# Patient Record
Sex: MALE | Race: WHITE | NOT HISPANIC OR LATINO
[De-identification: names, ages, dates, MRNs, and addresses within clinical notes are randomized per-mention and may not be internally consistent; named-entity substitution may affect disease eponyms.]

---

## 2022-07-01 ENCOUNTER — APPOINTMENT (OUTPATIENT)
Dept: ORTHOPEDIC SURGERY | Facility: CLINIC | Age: 67
End: 2022-07-01

## 2022-07-01 VITALS — BODY MASS INDEX: 27.49 KG/M2 | HEIGHT: 65 IN | WEIGHT: 165 LBS

## 2022-07-01 PROCEDURE — 73562 X-RAY EXAM OF KNEE 3: CPT | Mod: LT

## 2022-07-01 PROCEDURE — 99204 OFFICE O/P NEW MOD 45 MIN: CPT | Mod: 25

## 2022-07-01 PROCEDURE — 20610 DRAIN/INJ JOINT/BURSA W/O US: CPT | Mod: LT

## 2022-07-01 PROCEDURE — J3490M: CUSTOM

## 2022-07-01 NOTE — PROCEDURE
[Large Joint Injection] : Large joint injection [Left] : of the left [Knee] : knee [Pain] : pain [Inflammation] : inflammation [X-ray evidence of Osteoarthritis on this or prior visit] : x-ray evidence of Osteoarthritis on this or prior visit [Alcohol] : alcohol [Betadine] : betadine [___ cc    1%] : Lidocaine ~Vcc of 1%  [___ cc    80mg] : Methylprednisolone (Depomedrol) ~Vcc of 80 mg  [Effusion] : effusion [] : Patient tolerated procedure well [Call if redness, pain or fever occur] : call if redness, pain or fever occur [Apply ice for 15min out of every hour for the next 12-24 hours as tolerated] : apply ice for 15 minutes out of every hour for the next 12-24 hours as tolerated [Patient had decreased mobility in the joint] : patient had decreased mobility in the joint [Risks, benefits, alternatives discussed / Verbal consent obtained] : the risks benefits, and alternatives have been discussed, and verbal consent was obtained [de-identified] : 35cc [de-identified] : synovial

## 2022-07-01 NOTE — PHYSICAL EXAM
[NL (0)] : extension 0 degrees [] : mildly antalgic [Left] : left knee [Mild tricompartmental OA medial narrowing] : Mild tricompartmental OA medial narrowing [Mild patellofemoral OA] : Mild patellofemoral OA [TWNoteComboBox7] : flexion 125 degrees

## 2022-07-01 NOTE — ASSESSMENT
[FreeTextEntry1] : aspirated 35cc, CSI tolerated well.\par Ice, elevate.\par Consider visco\par Follow up prn.

## 2022-07-01 NOTE — HISTORY OF PRESENT ILLNESS
[8] : 8 [de-identified] : 7/1/22: 66 yo male with left knee pain since 6/24/22. He states he noticed pain and swelling, no specific injury. There is pain going from seated to standing position. He takes Norco and NSAIDs. [FreeTextEntry5] : pt c.o pain and stiffness in lt knee for a week

## 2022-08-02 ENCOUNTER — APPOINTMENT (OUTPATIENT)
Dept: ORTHOPEDIC SURGERY | Facility: CLINIC | Age: 67
End: 2022-08-02

## 2022-09-16 ENCOUNTER — APPOINTMENT (OUTPATIENT)
Dept: ORTHOPEDIC SURGERY | Facility: CLINIC | Age: 67
End: 2022-09-16

## 2022-09-16 VITALS — BODY MASS INDEX: 27.49 KG/M2 | HEIGHT: 65 IN | WEIGHT: 165 LBS

## 2022-09-16 DIAGNOSIS — M17.12 UNILATERAL PRIMARY OSTEOARTHRITIS, LEFT KNEE: ICD-10-CM

## 2022-09-16 DIAGNOSIS — M25.462 EFFUSION, LEFT KNEE: ICD-10-CM

## 2022-09-16 PROCEDURE — 99214 OFFICE O/P EST MOD 30 MIN: CPT | Mod: 25

## 2022-09-16 PROCEDURE — 20615 TREATMENT OF BONE CYST: CPT

## 2022-09-16 RX ORDER — MELOXICAM 15 MG/1
15 TABLET ORAL
Qty: 30 | Refills: 0 | Status: ACTIVE | COMMUNITY
Start: 2022-09-16 | End: 1900-01-01

## 2022-09-16 NOTE — PHYSICAL EXAM
[Left] : left knee [NL (0)] : extension 0 degrees [5___] : quadriceps 5[unfilled]/5 [] : mildly antalgic [TWNoteComboBox7] : flexion 125 degrees

## 2022-09-16 NOTE — REASON FOR VISIT
[FreeTextEntry2] : This is a 67 year old M with recurrent left knee effusions.  Aspiration and injection with Ruth PARMAR in July with good relief.  Followed up with his joint surgeon and completed visco series without relief.  No fcs.

## 2022-09-16 NOTE — HISTORY OF PRESENT ILLNESS
[10] : 10 [Sharp] : sharp [FreeTextEntry5] : pt c.o pain and swelling in lt knee, pt states past tretment with carolyn jenkins at

## 2022-09-16 NOTE — PROCEDURE
[Large Joint Injection] : Large joint injection [Left] : of the left [Knee] : knee [Pain] : pain [Inflammation] : inflammation [Betadine] : betadine [Effusion] : effusion [] : Patient tolerated procedure well [Call if redness, pain or fever occur] : call if redness, pain or fever occur [Apply ice for 15min out of every hour for the next 12-24 hours as tolerated] : apply ice for 15 minutes out of every hour for the next 12-24 hours as tolerated [Patient was advised to rest the joint(s) for ____ days] : patient was advised to rest the joint(s) for [unfilled] days [Previous OTC use and PT nontherapeutic] : patient has tried OTC's including aspirin, Ibuprofen, Aleve, etc or prescription NSAIDS, and/or exercises at home and/or physical therapy without satisfactory response [Patient had decreased mobility in the joint] : patient had decreased mobility in the joint [Risks, benefits, alternatives discussed / Verbal consent obtained] : the risks benefits, and alternatives have been discussed, and verbal consent was obtained [de-identified] : 55 cc [de-identified] : yellow [de-identified] : clear

## 2022-09-16 NOTE — ASSESSMENT
[FreeTextEntry1] : XRs from July reviewed.\par Options outlined.\par Aspiration today; will defer CSI as he is planning for TKA at some point in the future.\par Also recent dx of prostate ca (very early stages).\par Mobic, prn.\par FU here as needed.\par He will see his surgeon.

## 2023-10-17 ENCOUNTER — APPOINTMENT (OUTPATIENT)
Dept: ORTHOPEDIC SURGERY | Facility: CLINIC | Age: 68
End: 2023-10-17
Payer: MEDICARE

## 2023-10-17 VITALS — BODY MASS INDEX: 26.99 KG/M2 | HEIGHT: 65 IN | WEIGHT: 162 LBS

## 2023-10-17 PROCEDURE — 73130 X-RAY EXAM OF HAND: CPT | Mod: 50

## 2023-10-17 PROCEDURE — 99203 OFFICE O/P NEW LOW 30 MIN: CPT | Mod: 25

## 2023-10-17 PROCEDURE — 20550 NJX 1 TENDON SHEATH/LIGAMENT: CPT | Mod: LT

## 2023-10-31 ENCOUNTER — APPOINTMENT (OUTPATIENT)
Dept: ORTHOPEDIC SURGERY | Facility: CLINIC | Age: 68
End: 2023-10-31
Payer: MEDICARE

## 2023-10-31 DIAGNOSIS — M65.331 TRIGGER FINGER, RIGHT MIDDLE FINGER: ICD-10-CM

## 2023-10-31 DIAGNOSIS — M65.342 TRIGGER FINGER, LEFT RING FINGER: ICD-10-CM

## 2023-10-31 DIAGNOSIS — M65.321 TRIGGER FINGER, RIGHT INDEX FINGER: ICD-10-CM

## 2023-10-31 PROCEDURE — 99212 OFFICE O/P EST SF 10 MIN: CPT

## 2024-08-12 ENCOUNTER — APPOINTMENT (OUTPATIENT)
Dept: ORTHOPEDIC SURGERY | Facility: CLINIC | Age: 69
End: 2024-08-12

## 2024-08-12 DIAGNOSIS — M47.812 SPONDYLOSIS W/OUT MYELOPATHY OR RADICULOPATHY, CERVICAL REGION: ICD-10-CM

## 2024-08-12 DIAGNOSIS — M54.12 RADICULOPATHY, CERVICAL REGION: ICD-10-CM

## 2024-08-12 DIAGNOSIS — Z78.9 OTHER SPECIFIED HEALTH STATUS: ICD-10-CM

## 2024-08-12 PROCEDURE — 99203 OFFICE O/P NEW LOW 30 MIN: CPT

## 2024-08-12 PROCEDURE — 72050 X-RAY EXAM NECK SPINE 4/5VWS: CPT

## 2024-08-12 PROCEDURE — 99213 OFFICE O/P EST LOW 20 MIN: CPT

## 2024-08-12 NOTE — HISTORY OF PRESENT ILLNESS
[Neck] : neck [Dull/Aching] : dull/aching [Tingling] : tingling [Constant] : constant [Nothing helps with pain getting better] : Nothing helps with pain getting better [] : no [FreeTextEntry5] : 70 yo M presenting with neck pain that started about 5 months ago but worsened over the past few weeks. Numbness/tingling in left hand at night.  [FreeTextEntry7] : left hand

## 2024-09-23 ENCOUNTER — APPOINTMENT (OUTPATIENT)
Dept: ORTHOPEDIC SURGERY | Facility: CLINIC | Age: 69
End: 2024-09-23

## 2024-09-23 DIAGNOSIS — M54.12 RADICULOPATHY, CERVICAL REGION: ICD-10-CM

## 2024-09-23 DIAGNOSIS — M47.812 SPONDYLOSIS W/OUT MYELOPATHY OR RADICULOPATHY, CERVICAL REGION: ICD-10-CM

## 2024-09-23 PROCEDURE — 99213 OFFICE O/P EST LOW 20 MIN: CPT

## 2024-09-23 NOTE — IMAGING
[de-identified] : L trap spasm and tenderness  pain with flexion pain with extension full ROM with mild stiffness  motor exam 5/5 strength bilaterally sensory intact negative hoffmans +L negative spurlings C8 dermatome of pain [FreeTextEntry1] : spondylosis, most notably at C6-7

## 2024-09-23 NOTE — ASSESSMENT
[FreeTextEntry1] : 70 yo M with persistent neck and LUE radicular pain despite 6 weeks of PT. XRs with spondylosis, most notably at C6-7. Has C8 dermatome of pain.   -New PT rx provided, going to transfer care to new facility -Acupuncture rx -MRI CS indicated for further eval -Discussed the role of KARLIE with pain mgmt pending results if he doesn't find relief with PT/acupuncture - f/u after MRI to review

## 2024-09-23 NOTE — HISTORY OF PRESENT ILLNESS
[Neck] : neck [Dull/Aching] : dull/aching [Tingling] : tingling [Constant] : constant [Nothing helps with pain getting better] : Nothing helps with pain getting better [de-identified] : 09/23/2024: f/u CS. continues with neck and LUE Pain with n/t down the arm into the 4th and 5th digits. Has done weeks of PT without much relief. Interested in acupuncture.  [] : no [FreeTextEntry5] : 70 yo M presenting here today for f/u on neck, pt states still has pain did 3 weeks physical therapy but didn't help too much and is curious about acupuncture.  [FreeTextEntry7] : left hand

## 2024-09-23 NOTE — PHYSICAL EXAM
[Normal Mood and Affect] : normal mood and affect [Oriented] : oriented [Able to Communicate] : able to communicate [No obvious lymphadenopathy in areas examined] : no obvious lymphadenopathy in areas examined [Well Developed] : well developed [Peripheral vascular exam is grossly normal] : peripheral vascular exam is grossly normal [Rotation to left] : rotation to left [Rotation to right] : rotation to right [] : Pain does not radiate to right arm with motion

## 2024-09-25 ENCOUNTER — APPOINTMENT (OUTPATIENT)
Dept: MRI IMAGING | Facility: CLINIC | Age: 69
End: 2024-09-25
Payer: MEDICARE

## 2024-09-25 PROCEDURE — 72141 MRI NECK SPINE W/O DYE: CPT | Mod: MH

## 2024-09-27 ENCOUNTER — TRANSCRIPTION ENCOUNTER (OUTPATIENT)
Age: 69
End: 2024-09-27

## 2024-10-10 ENCOUNTER — APPOINTMENT (OUTPATIENT)
Dept: ORTHOPEDIC SURGERY | Facility: CLINIC | Age: 69
End: 2024-10-10
Payer: MEDICARE

## 2024-10-10 VITALS — HEIGHT: 65 IN | BODY MASS INDEX: 26.99 KG/M2 | WEIGHT: 162 LBS

## 2024-10-10 DIAGNOSIS — M54.12 RADICULOPATHY, CERVICAL REGION: ICD-10-CM

## 2024-10-10 DIAGNOSIS — M47.812 SPONDYLOSIS W/OUT MYELOPATHY OR RADICULOPATHY, CERVICAL REGION: ICD-10-CM

## 2024-10-10 PROCEDURE — 99214 OFFICE O/P EST MOD 30 MIN: CPT

## 2024-10-17 ENCOUNTER — APPOINTMENT (OUTPATIENT)
Dept: ORTHOPEDIC SURGERY | Facility: CLINIC | Age: 69
End: 2024-10-17

## 2024-10-17 DIAGNOSIS — M65.321 TRIGGER FINGER, RIGHT INDEX FINGER: ICD-10-CM

## 2024-10-17 PROCEDURE — 99213 OFFICE O/P EST LOW 20 MIN: CPT | Mod: 25

## 2024-10-17 PROCEDURE — 20550 NJX 1 TENDON SHEATH/LIGAMENT: CPT | Mod: RT

## 2024-10-29 ENCOUNTER — APPOINTMENT (OUTPATIENT)
Dept: ORTHOPEDIC SURGERY | Facility: CLINIC | Age: 69
End: 2024-10-29
Payer: MEDICARE

## 2024-10-29 DIAGNOSIS — M65.321 TRIGGER FINGER, RIGHT INDEX FINGER: ICD-10-CM

## 2024-10-29 PROCEDURE — 99212 OFFICE O/P EST SF 10 MIN: CPT

## 2024-11-26 ENCOUNTER — APPOINTMENT (OUTPATIENT)
Dept: ORTHOPEDIC SURGERY | Facility: CLINIC | Age: 69
End: 2024-11-26

## 2024-11-26 DIAGNOSIS — M65.342 TRIGGER FINGER, LEFT RING FINGER: ICD-10-CM

## 2024-11-26 PROCEDURE — 20550 NJX 1 TENDON SHEATH/LIGAMENT: CPT | Mod: LT

## 2024-11-26 PROCEDURE — 99213 OFFICE O/P EST LOW 20 MIN: CPT | Mod: 25

## 2025-02-25 ENCOUNTER — APPOINTMENT (OUTPATIENT)
Dept: ORTHOPEDIC SURGERY | Facility: CLINIC | Age: 70
End: 2025-02-25
Payer: MEDICARE

## 2025-02-25 DIAGNOSIS — M65.342 TRIGGER FINGER, LEFT RING FINGER: ICD-10-CM

## 2025-02-25 PROCEDURE — 20551 NJX 1 TENDON ORIGIN/INSJ: CPT

## 2025-02-25 PROCEDURE — 99213 OFFICE O/P EST LOW 20 MIN: CPT | Mod: 25

## 2025-04-23 ENCOUNTER — APPOINTMENT (OUTPATIENT)
Dept: ORTHOPEDIC SURGERY | Facility: CLINIC | Age: 70
End: 2025-04-23

## 2025-04-23 DIAGNOSIS — M65.342 TRIGGER FINGER, LEFT RING FINGER: ICD-10-CM

## 2025-04-23 DIAGNOSIS — M65.341 TRIGGER FINGER, RIGHT RING FINGER: ICD-10-CM

## 2025-04-23 PROCEDURE — 20550 NJX 1 TENDON SHEATH/LIGAMENT: CPT | Mod: RT

## 2025-04-23 PROCEDURE — 99213 OFFICE O/P EST LOW 20 MIN: CPT | Mod: 25

## 2025-05-14 RX ORDER — OXYCODONE AND ACETAMINOPHEN 5; 325 MG/1; MG/1
5-325 TABLET ORAL
Qty: 5 | Refills: 0 | Status: ACTIVE | COMMUNITY
Start: 2025-05-14 | End: 1900-01-01

## 2025-05-15 ENCOUNTER — APPOINTMENT (OUTPATIENT)
Age: 70
End: 2025-05-15
Payer: MEDICARE

## 2025-05-15 PROCEDURE — 26055 INCISE FINGER TENDON SHEATH: CPT | Mod: F3

## 2025-05-27 ENCOUNTER — APPOINTMENT (OUTPATIENT)
Dept: ORTHOPEDIC SURGERY | Facility: CLINIC | Age: 70
End: 2025-05-27
Payer: MEDICARE

## 2025-05-27 DIAGNOSIS — M65.342 TRIGGER FINGER, LEFT RING FINGER: ICD-10-CM

## 2025-05-27 PROCEDURE — 99024 POSTOP FOLLOW-UP VISIT: CPT

## 2025-08-26 ENCOUNTER — APPOINTMENT (OUTPATIENT)
Dept: ORTHOPEDIC SURGERY | Facility: CLINIC | Age: 70
End: 2025-08-26

## 2025-08-26 DIAGNOSIS — M65.341 TRIGGER FINGER, RIGHT RING FINGER: ICD-10-CM

## 2025-08-26 PROCEDURE — 99213 OFFICE O/P EST LOW 20 MIN: CPT | Mod: 25

## 2025-08-26 PROCEDURE — 20550 NJX 1 TENDON SHEATH/LIGAMENT: CPT
